# Patient Record
(demographics unavailable — no encounter records)

---

## 2024-12-31 NOTE — DISCUSSION/SUMMARY
[FreeTextEntry1] : Mr. Hills is a 67-year-old man who presents today for f/u of peripheral neuropathy, likely compressive vs. lumbar radiculopathy. Lab work unremarkable. Will reschedule EMG and perform MRI lumbar spine if indicated. Discussed risks of daily, long-term NSAID use. Plan to begin GBPfor neuropathic pain and numbness as well as other chronic pain. Follow up after EMG. All of his questions, concerns were addressed.

## 2024-12-31 NOTE — PHYSICAL EXAM
[FreeTextEntry1] : GENERAL PHYSICAL EXAM: GEN: no distress, normal affect EYES: sclera white, conjunctiva clear, no nystagmus CV: normal rhythm PULM: no respiratory distress, normal rhythm and effort EXT: no edema, no cyanosis MSK: muscle tone and strength normal SKIN: warm, dry, no rash or lesion on exposed skin   NEUROLOGICAL EXAM: Mental Status Orientation: alert and oriented to person, place, time, and situation Language: clear and fluent, intact comprehension and repetition  Cranial Nerves II: visual fields full to confrontation  III, IV, VI: PERRL, EOMI V, VII: facial sensation and movement intact and symmetric  VIII: hearing intact  IX, X: uvula midline, soft palate elevates normally  XI: BL shoulder shrug intact  XII: tongue midline  Motor Shoulder abd: 5 (R), 5 (L) EF/EE: 5 (R), 5 (L) WF/WE: 5 (R), 5 (L) hand : 5 (R), 5 (L) HF/HE: 5 (R), 5 (L) KF/KE: 5 (R), 5 (L) DF/PF: 5 (R), 5 (L)  Tone and bulk are normal in upper and lower limbs No pronator drift  Sensation Intact to light touch in all 4 EXTs Sensation to sharp and dull touch, vibration and proprioception intact on BL LE  Reflex 1+ in BL biceps, brachioradialis, patella  Coordination Normal FTN bilaterally Able to perform rapid, alternating movements  Gait Normal stance, stride, and pivot turn Tandem walk intact Negative Romberg

## 2024-12-31 NOTE — HISTORY OF PRESENT ILLNESS
[FreeTextEntry1] : INTERIM HISTORY: Lab work unremarkable. Reports missing scheduled EMG appointment due to unexpected loss in family. Continues to experience intermittent paresthesia in BL upper and lower extremities. Taking NSAIDs daily for pain in back and shoulder. Pending shoulder surgery 2/13/25/   INITIAL OFFICE VISIT 9/30/24: Arjun Hills is a 67-year-old man who presents with intermittent numbness and tingling in his hands and feet. The symptoms started around 6 months to a year ago. He reports that some days the symptoms are more pronounced than others. The numbness and tingling in his hands seem to be related to certain activities like riding a motorcycle where he has to  the handlebars tightly. He recently injured his right thumb while trying to throw a turtle over a fence, which caused numbness in that area. The numbness and tingling in his feet are more constant and not necessarily related to any specific activity. He denies any radiating pain down his legs or back pain. 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 He also reports occasional leg cramps.

## 2025-04-29 NOTE — DISCUSSION/SUMMARY
[FreeTextEntry1] : Mr. Hills is a 67-year-old man who presents today for f/u of peripheral neuropathy, likely compressive vs. lumbar radiculopathy. Lab work unremarkable. EMG normal. Will continue to use GBP as needed for neuropathic pain. He was advised to call if symptoms progress, he is no longer able to tolerate medication or medication is ineffective for pain. Follow-up PRN. All of his questions, concerns were addressed.

## 2025-04-29 NOTE — HISTORY OF PRESENT ILLNESS
[FreeTextEntry1] : INTERIM HISTORY: Takes GBP as needed with improvement in symptoms. Unable to take medication during the day 2/2 side effects but states bedtime dose seems to help the following day. Symptoms stable without progression or weakness. Had shoulder surgery in February and is recovering well. Denies new, concerning neurological symptoms.   INITIAL OFFICE VISIT 9/30/24: Arjun Hills is a 67-year-old man who presents with intermittent numbness and tingling in his hands and feet. The symptoms started around 6 months to a year ago. He reports that some days the symptoms are more pronounced than others. The numbness and tingling in his hands seem to be related to certain activities like riding a motorcycle where he has to  the handlebars tightly. He recently injured his right thumb while trying to throw a turtle over a fence, which caused numbness in that area. The numbness and tingling in his feet are more constant and not necessarily related to any specific activity. He denies any radiating pain down his legs or back pain. 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 He also reports occasional leg cramps.  12/30/24: Lab work unremarkable. Reports missing scheduled EMG appointment due to unexpected loss in family. Continues to experience intermittent paresthesia in BL upper and lower extremities. Taking NSAIDs daily for pain in back and shoulder. Pending shoulder surgery 2/13/25/